# Patient Record
Sex: MALE | Race: WHITE | NOT HISPANIC OR LATINO | Employment: OTHER | URBAN - METROPOLITAN AREA
[De-identification: names, ages, dates, MRNs, and addresses within clinical notes are randomized per-mention and may not be internally consistent; named-entity substitution may affect disease eponyms.]

---

## 2018-04-12 ENCOUNTER — INITIAL CONSULT (OUTPATIENT)
Dept: RHEUMATOLOGY | Facility: CLINIC | Age: 68
End: 2018-04-12
Payer: MEDICARE

## 2018-04-12 VITALS
BODY MASS INDEX: 26.5 KG/M2 | HEIGHT: 72 IN | DIASTOLIC BLOOD PRESSURE: 86 MMHG | WEIGHT: 195.63 LBS | SYSTOLIC BLOOD PRESSURE: 129 MMHG | HEART RATE: 71 BPM

## 2018-04-12 DIAGNOSIS — M60.9 MYOSITIS, UNSPECIFIED MYOSITIS TYPE, UNSPECIFIED SITE: ICD-10-CM

## 2018-04-12 DIAGNOSIS — M19.90 OSTEOARTHRITIS, UNSPECIFIED OSTEOARTHRITIS TYPE, UNSPECIFIED SITE: Primary | ICD-10-CM

## 2018-04-12 DIAGNOSIS — M17.0 OSTEOARTHRITIS OF BOTH KNEES, UNSPECIFIED OSTEOARTHRITIS TYPE: ICD-10-CM

## 2018-04-12 DIAGNOSIS — R79.9 ABNORMAL FINDING OF BLOOD CHEMISTRY: ICD-10-CM

## 2018-04-12 DIAGNOSIS — M54.5 LOW BACK PAIN, UNSPECIFIED BACK PAIN LATERALITY, UNSPECIFIED CHRONICITY, WITH SCIATICA PRESENCE UNSPECIFIED: ICD-10-CM

## 2018-04-12 DIAGNOSIS — M25.50 ARTHRALGIA, UNSPECIFIED JOINT: ICD-10-CM

## 2018-04-12 DIAGNOSIS — R94.6 ABNORMAL RESULTS OF THYROID FUNCTION STUDIES: ICD-10-CM

## 2018-04-12 PROCEDURE — 99204 OFFICE O/P NEW MOD 45 MIN: CPT | Mod: S$PBB,,, | Performed by: INTERNAL MEDICINE

## 2018-04-12 PROCEDURE — 99204 OFFICE O/P NEW MOD 45 MIN: CPT | Mod: PBBFAC,PO | Performed by: INTERNAL MEDICINE

## 2018-04-12 PROCEDURE — 99999 PR PBB SHADOW E&M-NEW PATIENT-LVL IV: CPT | Mod: PBBFAC,,, | Performed by: INTERNAL MEDICINE

## 2018-04-12 ASSESSMENT — ROUTINE ASSESSMENT OF PATIENT INDEX DATA (RAPID3)
TOTAL RAPID3 SCORE: .89
PSYCHOLOGICAL DISTRESS SCORE: 2.2
PAIN SCORE: .5
PATIENT GLOBAL ASSESSMENT SCORE: .5
MDHAQ FUNCTION SCORE: .5

## 2018-04-12 NOTE — PROGRESS NOTES
Subjective:       Patient ID: Micha Muñoz is a 67 y.o. male.    Chief Complaint: Rheumatoid Arthritis      Disease Management HPI      Osteoarthritis    The disease course has been fluctuating.     He complains of stiffness and joint swelling. The symptoms have been worsening. Affected locations include the right hip, left hip, right knee, left knee, left foot and left toes. Associated symptoms include myalgias. Pertinent negatives include no dysuria, fatigue, fever, trouble swallowing or headaches. The neck, right knee and left knee presents with Arthralgia.    The treatment provided moderate relief. Factors aggravating his arthritis include activity.     Review of Systems   Constitutional: Negative for activity change, appetite change, chills, diaphoresis, fatigue, fever and unexpected weight change.   HENT: Negative for congestion, ear pain, facial swelling, mouth sores, nosebleeds, postnasal drip, rhinorrhea, sinus pressure, sneezing, sore throat, tinnitus, trouble swallowing and voice change.    Eyes: Negative for pain, discharge, redness, itching and visual disturbance.   Respiratory: Negative for apnea, cough, chest tightness, shortness of breath and wheezing.    Cardiovascular: Negative for chest pain, palpitations and leg swelling.   Gastrointestinal: Negative for abdominal pain, constipation, diarrhea, nausea and vomiting.   Endocrine: Negative for cold intolerance, heat intolerance, polydipsia and polyuria.   Genitourinary: Negative for decreased urine volume, difficulty urinating, dysuria, flank pain, frequency, hematuria and urgency.   Musculoskeletal: Positive for back pain, gait problem, joint swelling, myalgias, neck pain, neck stiffness and stiffness. Negative for arthralgias.   Skin: Positive for rash. Negative for pallor and wound.   Allergic/Immunologic: Negative for immunocompromised state.   Neurological: Negative for dizziness, tremors, seizures, syncope, weakness, numbness and headaches.    Hematological: Negative for adenopathy. Does not bruise/bleed easily.   Psychiatric/Behavioral: Negative for sleep disturbance and suicidal ideas. The patient is not nervous/anxious.          Objective:   There were no vitals taken for this visit.     Physical Exam   Vitals reviewed.  Constitutional: He is oriented to person, place, and time and well-developed, well-nourished, and in no distress.   HENT:   Head: Normocephalic and atraumatic.   Mouth/Throat: Oropharynx is clear and moist.   Eyes: EOM are normal. Pupils are equal, round, and reactive to light.   Neck: Neck supple. No thyromegaly present.   Cardiovascular: Normal rate, regular rhythm and normal heart sounds.  Exam reveals no gallop and no friction rub.    No murmur heard.  Pulmonary/Chest: Breath sounds normal. He has no wheezes. He has no rales. He exhibits no tenderness.   Abdominal: There is no tenderness. There is no rebound and no guarding.       Right Side Rheumatological Exam     The patient is tender to palpation of the shoulder, elbow, wrist, knee, 1st PIP, 1st MCP, 2nd PIP, 2nd MCP, 3rd PIP, 3rd MCP, 4th PIP, 4th MCP and 5th PIP    He has swelling of the elbow, wrist, knee, 1st PIP, 1st MCP, 2nd PIP, 2nd MCP, 3rd PIP, 3rd MCP, 4th PIP, 4th MCP, 5th PIP and 5th MCP    The patient has an enlarged wrist and knee    Shoulder Exam   Tenderness Location: no tenderness    Range of Motion   Active Abduction: abnormal   Adduction: abnormal  Sensation: normal    Knee Exam   Tenderness Location: medial joint line and LCL  Patellofemoral Crepitus: positive  Effusion: positive  Sensation: normal    Hip Exam   Tenderness Location: posterior and anterior  Sensation: normal    Elbow/Wrist Exam   Tenderness Location: no tenderness  Sensation: normal    Left Side Rheumatological Exam     The patient is tender to palpation of the shoulder, elbow, wrist, knee, 1st PIP, 1st MCP, 2nd PIP, 2nd MCP, 3rd PIP, 3rd MCP, 4th PIP, 4th MCP, 5th PIP and 5th MCP.    He  has swelling of the wrist, knee, 1st PIP, 1st MCP, 2nd PIP, 2nd MCP, 3rd PIP, 3rd MCP, 4th PIP, 4th MCP, 5th PIP and 5th MCP    The patient has an enlarged knee.    Shoulder Exam   Tenderness Location: no tenderness    Range of Motion   Active Abduction: abnormal   Sensation: normal    Knee Exam   Tenderness Location: lateral joint line and medial joint line    Patellofemoral Crepitus: positive  Effusion: positive  Sensation: normal    Hip Exam   Tenderness Location: posterior and anterior  Sensation: normal    Elbow/Wrist Exam   Sensation: normal      Back/Neck Exam   General Inspection   Gait: normal       Tenderness Right paramedian tenderness of the Upper C-Spine, Lower C-Spine, Lower L-Spine and SI Joint.Left paramedian tenderness of the Upper C-Spine, Lower L-Spine, Lower C-Spine and SI Joint.    Neck Range of Motion   Flexion: Limited  Extension: Limited  Lymphadenopathy:     He has no cervical adenopathy.   Neurological: He is alert and oriented to person, place, and time. Gait normal.   Skin: No rash noted. No erythema. No pallor.     Psychiatric: Mood and affect normal.   Musculoskeletal: He exhibits tenderness and deformity. He exhibits no edema.           Assessment:       1. Osteoarthritis, unspecified osteoarthritis type, unspecified site    2. Osteoarthritis of both knees, unspecified osteoarthritis type    3. Arthralgia, unspecified joint    4. Low back pain, unspecified back pain laterality, unspecified chronicity, with sciatica presence unspecified    5. Abnormal results of thyroid function studies     6. Myositis, unspecified myositis type, unspecified site     7. Abnormal finding of blood chemistry             Plan:       Micha was seen today for rheumatoid arthritis.    Diagnoses and all orders for this visit:    Osteoarthritis, unspecified osteoarthritis type, unspecified site  -     CBC auto differential; Future  -     Comprehensive metabolic panel; Future  -     C-reactive protein; Future  -      HLA B27 Antigen; Future  -     Aldolase; Future  -     CK; Future  -     TSH; Future  -     T4, free; Future  -     T3, free; Future  -     Thyroid peroxidase antibody; Future  -     Anti-thyroglobulin antibody; Future  -     Hepatitis C antibody; Future  -     Hepatitis B core antibody, IgM; Future  -     Vitamin D; Future  -     X-Ray Lumbar Spine AP And Lateral; Future  -     X-Ray Foot Complete Left; Future  -     X-Ray Knee 3 View Bilateral; Future  -     Cancel: Lipid panel; Future  -     Hemoglobin A1c; Future  -     Lipid panel  -     Lipid panel; Future    Osteoarthritis of both knees, unspecified osteoarthritis type  -     CBC auto differential; Future  -     Comprehensive metabolic panel; Future  -     C-reactive protein; Future  -     HLA B27 Antigen; Future  -     Aldolase; Future  -     CK; Future  -     TSH; Future  -     T4, free; Future  -     T3, free; Future  -     Thyroid peroxidase antibody; Future  -     Anti-thyroglobulin antibody; Future  -     Hepatitis C antibody; Future  -     Hepatitis B core antibody, IgM; Future  -     Vitamin D; Future  -     X-Ray Lumbar Spine AP And Lateral; Future  -     X-Ray Foot Complete Left; Future  -     X-Ray Knee 3 View Bilateral; Future  -     Cancel: Lipid panel; Future  -     Hemoglobin A1c; Future  -     Lipid panel  -     Lipid panel; Future    Arthralgia, unspecified joint  -     CBC auto differential; Future  -     Comprehensive metabolic panel; Future  -     C-reactive protein; Future  -     HLA B27 Antigen; Future  -     Aldolase; Future  -     CK; Future  -     TSH; Future  -     T4, free; Future  -     T3, free; Future  -     Thyroid peroxidase antibody; Future  -     Anti-thyroglobulin antibody; Future  -     Hepatitis C antibody; Future  -     Hepatitis B core antibody, IgM; Future  -     Vitamin D; Future  -     X-Ray Lumbar Spine AP And Lateral; Future  -     X-Ray Foot Complete Left; Future  -     X-Ray Knee 3 View Bilateral; Future  -      Cancel: Lipid panel; Future  -     Hemoglobin A1c; Future  -     Lipid panel  -     Lipid panel; Future    Low back pain, unspecified back pain laterality, unspecified chronicity, with sciatica presence unspecified  -     CBC auto differential; Future  -     Comprehensive metabolic panel; Future  -     C-reactive protein; Future  -     HLA B27 Antigen; Future  -     Aldolase; Future  -     CK; Future  -     TSH; Future  -     T4, free; Future  -     T3, free; Future  -     Thyroid peroxidase antibody; Future  -     Anti-thyroglobulin antibody; Future  -     Hepatitis C antibody; Future  -     Hepatitis B core antibody, IgM; Future  -     Vitamin D; Future  -     X-Ray Lumbar Spine AP And Lateral; Future  -     X-Ray Foot Complete Left; Future  -     X-Ray Knee 3 View Bilateral; Future  -     Cancel: Lipid panel; Future  -     Hemoglobin A1c; Future  -     Lipid panel  -     Lipid panel; Future    Abnormal results of thyroid function studies   -     CBC auto differential; Future  -     Comprehensive metabolic panel; Future  -     C-reactive protein; Future  -     HLA B27 Antigen; Future  -     Aldolase; Future  -     CK; Future  -     TSH; Future  -     T4, free; Future  -     T3, free; Future  -     Thyroid peroxidase antibody; Future  -     Anti-thyroglobulin antibody; Future  -     Hepatitis C antibody; Future  -     Hepatitis B core antibody, IgM; Future  -     Vitamin D; Future  -     X-Ray Lumbar Spine AP And Lateral; Future  -     X-Ray Foot Complete Left; Future  -     X-Ray Knee 3 View Bilateral; Future  -     Cancel: Lipid panel; Future  -     Hemoglobin A1c; Future  -     Lipid panel  -     Lipid panel; Future    Myositis, unspecified myositis type, unspecified site   -     Vitamin D; Future  -     Lipid panel; Future    Abnormal finding of blood chemistry   -     Cancel: Lipid panel; Future  -     Hemoglobin A1c; Future  -     Lipid panel  -     Lipid panel; Future

## 2018-04-13 ENCOUNTER — HOSPITAL ENCOUNTER (OUTPATIENT)
Dept: RADIOLOGY | Facility: HOSPITAL | Age: 68
Discharge: HOME | End: 2018-04-13
Attending: INTERNAL MEDICINE
Payer: MEDICARE

## 2018-04-13 DIAGNOSIS — M17.0 OSTEOARTHRITIS OF BOTH KNEES, UNSPECIFIED OSTEOARTHRITIS TYPE: ICD-10-CM

## 2018-04-13 DIAGNOSIS — R94.6 ABNORMAL RESULTS OF THYROID FUNCTION STUDIES: ICD-10-CM

## 2018-04-13 DIAGNOSIS — M25.50 ARTHRALGIA, UNSPECIFIED JOINT: ICD-10-CM

## 2018-04-13 DIAGNOSIS — M19.90 OSTEOARTHRITIS, UNSPECIFIED OSTEOARTHRITIS TYPE, UNSPECIFIED SITE: ICD-10-CM

## 2018-04-13 DIAGNOSIS — M54.5 LOW BACK PAIN, UNSPECIFIED BACK PAIN LATERALITY, UNSPECIFIED CHRONICITY, WITH SCIATICA PRESENCE UNSPECIFIED: ICD-10-CM

## 2018-04-13 PROCEDURE — 73562 X-RAY EXAM OF KNEE 3: CPT | Mod: 26,RT,, | Performed by: RADIOLOGY

## 2018-04-13 PROCEDURE — 73562 X-RAY EXAM OF KNEE 3: CPT | Mod: 50,TC,FY,PO

## 2018-04-13 PROCEDURE — 72100 X-RAY EXAM L-S SPINE 2/3 VWS: CPT | Mod: TC,FY,PO

## 2018-04-13 PROCEDURE — 73630 X-RAY EXAM OF FOOT: CPT | Mod: TC,FY,PO,LT

## 2018-04-13 PROCEDURE — 73630 X-RAY EXAM OF FOOT: CPT | Mod: 26,LT,, | Performed by: RADIOLOGY

## 2018-04-13 PROCEDURE — 72100 X-RAY EXAM L-S SPINE 2/3 VWS: CPT | Mod: 26,,, | Performed by: RADIOLOGY

## 2018-04-13 PROCEDURE — 73562 X-RAY EXAM OF KNEE 3: CPT | Mod: 26,LT,, | Performed by: RADIOLOGY

## 2018-06-13 ENCOUNTER — OFFICE VISIT (OUTPATIENT)
Dept: RHEUMATOLOGY | Facility: CLINIC | Age: 68
End: 2018-06-13
Payer: MEDICARE

## 2018-06-13 VITALS
HEIGHT: 72 IN | BODY MASS INDEX: 26.37 KG/M2 | WEIGHT: 194.69 LBS | DIASTOLIC BLOOD PRESSURE: 80 MMHG | HEART RATE: 62 BPM | SYSTOLIC BLOOD PRESSURE: 132 MMHG

## 2018-06-13 DIAGNOSIS — M54.5 LOW BACK PAIN, UNSPECIFIED BACK PAIN LATERALITY, UNSPECIFIED CHRONICITY, WITH SCIATICA PRESENCE UNSPECIFIED: ICD-10-CM

## 2018-06-13 DIAGNOSIS — M19.90 OSTEOARTHRITIS, UNSPECIFIED OSTEOARTHRITIS TYPE, UNSPECIFIED SITE: Primary | ICD-10-CM

## 2018-06-13 DIAGNOSIS — M17.0 OSTEOARTHRITIS OF BOTH KNEES, UNSPECIFIED OSTEOARTHRITIS TYPE: ICD-10-CM

## 2018-06-13 PROCEDURE — 99999 PR PBB SHADOW E&M-EST. PATIENT-LVL III: CPT | Mod: PBBFAC,,, | Performed by: INTERNAL MEDICINE

## 2018-06-13 PROCEDURE — 99213 OFFICE O/P EST LOW 20 MIN: CPT | Mod: PBBFAC,PO | Performed by: INTERNAL MEDICINE

## 2018-06-13 PROCEDURE — 99213 OFFICE O/P EST LOW 20 MIN: CPT | Mod: S$PBB,,, | Performed by: INTERNAL MEDICINE

## 2018-06-13 RX ORDER — AMOXICILLIN 500 MG
CAPSULE ORAL DAILY
COMMUNITY

## 2018-06-13 RX ORDER — VIT C/E/ZN/COPPR/LUTEIN/ZEAXAN 250MG-90MG
1000 CAPSULE ORAL DAILY
Refills: 0 | COMMUNITY
Start: 2018-06-13 | End: 2018-12-03 | Stop reason: SDUPTHER

## 2018-06-13 RX ORDER — UBIDECARENONE 30 MG
30 CAPSULE ORAL 3 TIMES DAILY
COMMUNITY
End: 2023-03-02

## 2018-06-13 NOTE — PROGRESS NOTES
Subjective:       Patient ID: Micha Muñoz is a 68 y.o. male.    Chief Complaint: Follow-up    Follow up: pt is a 67 yo  With sig OA treated with OCT,  Knee pain is oa and lumbar pain       Review of Systems   Constitutional: Negative for activity change, appetite change, chills, diaphoresis and unexpected weight change.   HENT: Negative for congestion, ear pain, facial swelling, mouth sores, nosebleeds, postnasal drip, rhinorrhea, sinus pressure, sneezing, sore throat, tinnitus and voice change.    Eyes: Negative for pain, discharge, redness, itching and visual disturbance.   Respiratory: Negative for apnea, cough, chest tightness, shortness of breath and wheezing.    Cardiovascular: Negative for chest pain, palpitations and leg swelling.   Gastrointestinal: Negative for abdominal pain, constipation, diarrhea, nausea and vomiting.   Endocrine: Negative for cold intolerance, heat intolerance, polydipsia and polyuria.   Genitourinary: Negative for decreased urine volume, difficulty urinating, flank pain, frequency, hematuria and urgency.   Musculoskeletal: Positive for back pain, gait problem, neck pain and neck stiffness. Negative for arthralgias.   Skin: Positive for rash. Negative for pallor and wound.   Allergic/Immunologic: Negative for immunocompromised state.   Neurological: Negative for dizziness, tremors, seizures, syncope, weakness and numbness.   Hematological: Negative for adenopathy. Does not bruise/bleed easily.   Psychiatric/Behavioral: Negative for sleep disturbance and suicidal ideas. The patient is not nervous/anxious.          Objective:   /80   Pulse 62   Ht 6' (1.829 m)   Wt 88.3 kg (194 lb 10.7 oz)   BMI 26.40 kg/m²      Physical Exam   Vitals reviewed.  Constitutional: He is oriented to person, place, and time and well-developed, well-nourished, and in no distress.   HENT:   Head: Normocephalic and atraumatic.   Mouth/Throat: Oropharynx is clear and moist.   Eyes: EOM are normal.  Pupils are equal, round, and reactive to light.   Neck: Neck supple. No thyromegaly present.   Cardiovascular: Normal rate, regular rhythm and normal heart sounds.  Exam reveals no gallop and no friction rub.    No murmur heard.  Pulmonary/Chest: Breath sounds normal. He has no wheezes. He has no rales. He exhibits no tenderness.   Abdominal: There is no tenderness. There is no rebound and no guarding.       Right Side Rheumatological Exam     The patient is tender to palpation of the shoulder, elbow, wrist, knee, 1st PIP, 1st MCP, 2nd PIP, 2nd MCP, 3rd PIP, 3rd MCP, 4th PIP, 4th MCP and 5th PIP    He has swelling of the elbow, wrist, knee, 1st PIP, 1st MCP, 2nd PIP, 2nd MCP, 3rd PIP, 3rd MCP, 4th PIP, 4th MCP, 5th PIP and 5th MCP    The patient has an enlarged wrist and knee    Shoulder Exam   Tenderness Location: no tenderness    Range of Motion   Active Abduction: abnormal   Adduction: abnormal  Sensation: normal    Knee Exam   Tenderness Location: medial joint line and LCL  Patellofemoral Crepitus: positive  Effusion: positive  Sensation: normal    Hip Exam   Tenderness Location: posterior and anterior  Sensation: normal    Elbow/Wrist Exam   Tenderness Location: no tenderness  Sensation: normal    Left Side Rheumatological Exam     The patient is tender to palpation of the shoulder, elbow, wrist, knee, 1st PIP, 1st MCP, 2nd PIP, 2nd MCP, 3rd PIP, 3rd MCP, 4th PIP, 4th MCP, 5th PIP and 5th MCP.    He has swelling of the wrist, knee, 1st PIP, 1st MCP, 2nd PIP, 2nd MCP, 3rd PIP, 3rd MCP, 4th PIP, 4th MCP, 5th PIP and 5th MCP    The patient has an enlarged knee.    Shoulder Exam   Tenderness Location: no tenderness    Range of Motion   Active Abduction: abnormal   Sensation: normal    Knee Exam   Tenderness Location: lateral joint line and medial joint line    Patellofemoral Crepitus: positive  Effusion: positive  Sensation: normal    Hip Exam   Tenderness Location: posterior and anterior  Sensation:  normal    Elbow/Wrist Exam   Sensation: normal      Back/Neck Exam   General Inspection   Gait: normal       Tenderness Right paramedian tenderness of the Upper C-Spine, Lower C-Spine, Lower L-Spine and SI Joint.Left paramedian tenderness of the Upper C-Spine, Lower L-Spine, Lower C-Spine and SI Joint.    Neck Range of Motion   Flexion: Limited  Extension: Limited  Lymphadenopathy:     He has no cervical adenopathy.   Neurological: He is alert and oriented to person, place, and time. Gait normal.   Skin: No rash noted. No erythema. No pallor.     Psychiatric: Mood and affect normal.   Musculoskeletal: He exhibits tenderness and deformity. He exhibits no edema.           Assessment:       1. Osteoarthritis, unspecified osteoarthritis type, unspecified site    2. Osteoarthritis of both knees, unspecified osteoarthritis type    3. Low back pain, unspecified back pain laterality, unspecified chronicity, with sciatica presence unspecified            Plan:       Micha was seen today for follow-up.    Diagnoses and all orders for this visit:    Osteoarthritis, unspecified osteoarthritis type, unspecified site  -     cholecalciferol, vitamin D3, 1,000 unit capsule; Take 1 capsule (1,000 Units total) by mouth once daily.    Osteoarthritis of both knees, unspecified osteoarthritis type  -     cholecalciferol, vitamin D3, 1,000 unit capsule; Take 1 capsule (1,000 Units total) by mouth once daily.    Low back pain, unspecified back pain laterality, unspecified chronicity, with sciatica presence unspecified  -     cholecalciferol, vitamin D3, 1,000 unit capsule; Take 1 capsule (1,000 Units total) by mouth once daily.    reviewed labs and xraysover 50% of this visit was explain labs and possible disease states and course of treatments

## 2019-09-25 PROBLEM — I71.20 THORACIC AORTIC ANEURYSM WITHOUT RUPTURE: Status: ACTIVE | Noted: 2019-09-25

## 2019-09-25 PROBLEM — E78.5 HYPERLIPIDEMIA LDL GOAL <160: Status: ACTIVE | Noted: 2019-09-25

## 2020-12-21 ENCOUNTER — PATIENT MESSAGE (OUTPATIENT)
Dept: OTHER | Facility: OTHER | Age: 70
End: 2020-12-21

## 2021-01-09 ENCOUNTER — IMMUNIZATION (OUTPATIENT)
Dept: FAMILY MEDICINE | Facility: CLINIC | Age: 71
End: 2021-01-09
Payer: MEDICARE

## 2021-01-09 DIAGNOSIS — Z23 NEED FOR VACCINATION: ICD-10-CM

## 2021-01-09 PROCEDURE — 91300 COVID-19, MRNA, LNP-S, PF, 30 MCG/0.3 ML DOSE VACCINE: CPT | Mod: PBBFAC,PO

## 2021-01-30 ENCOUNTER — IMMUNIZATION (OUTPATIENT)
Dept: FAMILY MEDICINE | Facility: CLINIC | Age: 71
End: 2021-01-30
Payer: MEDICARE

## 2021-01-30 DIAGNOSIS — Z23 NEED FOR VACCINATION: Primary | ICD-10-CM

## 2021-01-30 PROCEDURE — 0002A COVID-19, MRNA, LNP-S, PF, 30 MCG/0.3 ML DOSE VACCINE: CPT | Mod: PBBFAC,PO

## 2021-01-30 PROCEDURE — 91300 COVID-19, MRNA, LNP-S, PF, 30 MCG/0.3 ML DOSE VACCINE: CPT | Mod: PBBFAC,PO

## 2021-11-29 PROBLEM — M17.12 PRIMARY OSTEOARTHRITIS OF LEFT KNEE: Status: ACTIVE | Noted: 2021-11-29

## 2021-11-29 PROBLEM — E78.2 MIXED HYPERLIPIDEMIA: Status: ACTIVE | Noted: 2019-09-25

## 2023-03-02 PROBLEM — R35.1 BENIGN PROSTATIC HYPERPLASIA WITH NOCTURIA: Status: ACTIVE | Noted: 2023-03-02

## 2023-03-02 PROBLEM — I42.9 CARDIOMYOPATHY, UNSPECIFIED TYPE: Status: ACTIVE | Noted: 2023-03-02

## 2023-03-02 PROBLEM — N40.1 BENIGN PROSTATIC HYPERPLASIA WITH NOCTURIA: Status: ACTIVE | Noted: 2023-03-02
